# Patient Record
Sex: FEMALE | Race: WHITE | URBAN - METROPOLITAN AREA
[De-identification: names, ages, dates, MRNs, and addresses within clinical notes are randomized per-mention and may not be internally consistent; named-entity substitution may affect disease eponyms.]

---
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
## 2025-06-09 ENCOUNTER — OFFICE VISIT (OUTPATIENT)
Facility: CLINIC | Age: 20
End: 2025-06-09
Payer: COMMERCIAL

## 2025-06-09 VITALS
BODY MASS INDEX: 28.95 KG/M2 | DIASTOLIC BLOOD PRESSURE: 74 MMHG | OXYGEN SATURATION: 98 % | SYSTOLIC BLOOD PRESSURE: 120 MMHG | HEART RATE: 71 BPM | RESPIRATION RATE: 18 BRPM | HEIGHT: 63 IN | TEMPERATURE: 98.8 F | WEIGHT: 163.4 LBS

## 2025-06-09 DIAGNOSIS — R10.10 INTERMITTENT UPPER ABDOMINAL PAIN: ICD-10-CM

## 2025-06-09 DIAGNOSIS — G47.419 NARCOLEPSY WITHOUT CATAPLEXY: Primary | ICD-10-CM

## 2025-06-09 PROCEDURE — 99204 OFFICE O/P NEW MOD 45 MIN: CPT | Performed by: NURSE PRACTITIONER

## 2025-06-09 RX ORDER — METHYLPHENIDATE HYDROCHLORIDE 10 MG/1
10 CAPSULE, EXTENDED RELEASE ORAL
Qty: 15 CAPSULE | Refills: 0 | Status: SHIPPED | OUTPATIENT
Start: 2025-06-09 | End: 2025-06-11 | Stop reason: SDUPTHER

## 2025-06-09 RX ORDER — METHYLPHENIDATE HYDROCHLORIDE 5 MG/1
5 TABLET ORAL
Qty: 15 TABLET | Refills: 0 | Status: SHIPPED | OUTPATIENT
Start: 2025-06-09 | End: 2025-06-11 | Stop reason: SDUPTHER

## 2025-06-09 SDOH — ECONOMIC STABILITY: FOOD INSECURITY: WITHIN THE PAST 12 MONTHS, THE FOOD YOU BOUGHT JUST DIDN'T LAST AND YOU DIDN'T HAVE MONEY TO GET MORE.: NEVER TRUE

## 2025-06-09 SDOH — HEALTH STABILITY: PHYSICAL HEALTH: ON AVERAGE, HOW MANY MINUTES DO YOU ENGAGE IN EXERCISE AT THIS LEVEL?: 60 MIN

## 2025-06-09 SDOH — ECONOMIC STABILITY: FOOD INSECURITY: WITHIN THE PAST 12 MONTHS, YOU WORRIED THAT YOUR FOOD WOULD RUN OUT BEFORE YOU GOT MONEY TO BUY MORE.: NEVER TRUE

## 2025-06-09 SDOH — HEALTH STABILITY: PHYSICAL HEALTH: ON AVERAGE, HOW MANY DAYS PER WEEK DO YOU ENGAGE IN MODERATE TO STRENUOUS EXERCISE (LIKE A BRISK WALK)?: 4 DAYS

## 2025-06-09 ASSESSMENT — PATIENT HEALTH QUESTIONNAIRE - PHQ9
SUM OF ALL RESPONSES TO PHQ QUESTIONS 1-9: 0
1. LITTLE INTEREST OR PLEASURE IN DOING THINGS: NOT AT ALL
SUM OF ALL RESPONSES TO PHQ QUESTIONS 1-9: 0
SUM OF ALL RESPONSES TO PHQ QUESTIONS 1-9: 0
2. FEELING DOWN, DEPRESSED OR HOPELESS: NOT AT ALL
SUM OF ALL RESPONSES TO PHQ QUESTIONS 1-9: 0

## 2025-06-09 ASSESSMENT — ENCOUNTER SYMPTOMS
CHEST TIGHTNESS: 0
ABDOMINAL PAIN: 1
SHORTNESS OF BREATH: 0

## 2025-06-09 NOTE — PROGRESS NOTES
Siobhan Deng (:  2005) is a 20 y.o. female, New patient, presents alone, here for evaluation of the following chief complaint(s):  Narcolepsy  History obtained from patient.       Assessment & Plan  1. Narcolepsy.  - Previously prescribed modafinil, which caused eye fluttering and did not keep her awake.  - Another medication was prescribed but not picked up due to concerns about interactions with her birth control.  - Methylphenidate will be prescribed for 2 weeks to manage her narcolepsy symptoms.  - Advised to report back on the effectiveness of this treatment.    2. Abdominal pain.  - Reports intermittent sharp, crampy pain in the abdomen, which occurs randomly and can last about an hour.  - Pain is sometimes alleviated by stretching but not by rubbing; no heartburn or constipation experienced.  - Previous evaluations included blood work and an ultrasound, which suggested possible peptic ulcer or acid reflux.  - Further evaluation by a gastroenterologist is recommended to determine the cause of the pain.    Results      Siobhan was seen today for narcolepsy.    Diagnoses and all orders for this visit:    Narcolepsy without cataplexy  -     methylphenidate (RITALIN LA) 10 MG extended release capsule; Take 1 capsule by mouth every morning (before breakfast) for 15 days. Max Daily Amount: 10 mg  -     methylphenidate (RITALIN) 5 MG tablet; Take 1 tablet by mouth daily (before lunch) for 15 days. Max Daily Amount: 5 mg    Intermittent upper abdominal pain    [facility] and [facility], reviewed medications and side effects in detail  I have discussed the diagnosis with the patient and the intended plan as seen in the above orders.  The patient has received an after-visit summary and questions were answered concerning future plans.  I have discussed medication side effects and warnings with the patient as well. Patient agreeable with above plan and verbalizes understanding.    Return in

## 2025-06-09 NOTE — PROGRESS NOTES
Have you been to the ER, urgent care clinic since your last visit?  Hospitalized since your last visit?   NO    Have you seen or consulted any other health care providers outside our system since your last visit?   YES - When: approximately 3 months ago.  Where and Why: [facility].     “Have you had a pap smear?”    NO    No cervical cancer screening on file

## 2025-06-11 DIAGNOSIS — G47.419 NARCOLEPSY WITHOUT CATAPLEXY: ICD-10-CM

## 2025-06-11 RX ORDER — METHYLPHENIDATE HYDROCHLORIDE 10 MG/1
10 CAPSULE, EXTENDED RELEASE ORAL
Qty: 15 CAPSULE | Refills: 0 | Status: SHIPPED | OUTPATIENT
Start: 2025-06-11 | End: 2025-06-26

## 2025-06-11 RX ORDER — METHYLPHENIDATE HYDROCHLORIDE 5 MG/1
5 TABLET ORAL
Qty: 15 TABLET | Refills: 0 | Status: SHIPPED | OUTPATIENT
Start: 2025-06-11 | End: 2025-06-26

## 2025-06-25 ENCOUNTER — TELEMEDICINE (OUTPATIENT)
Facility: CLINIC | Age: 20
End: 2025-06-25
Payer: COMMERCIAL

## 2025-06-25 DIAGNOSIS — Z01.118 ABNORMAL OTOSCOPIC EXAM OF RIGHT EAR: ICD-10-CM

## 2025-06-25 DIAGNOSIS — G47.419 NARCOLEPSY WITHOUT CATAPLEXY: Primary | ICD-10-CM

## 2025-06-25 PROCEDURE — 99213 OFFICE O/P EST LOW 20 MIN: CPT | Performed by: NURSE PRACTITIONER

## 2025-06-25 RX ORDER — METHYLPHENIDATE HYDROCHLORIDE 10 MG/1
10 TABLET ORAL 2 TIMES DAILY
Qty: 60 TABLET | Refills: 0 | Status: SHIPPED | OUTPATIENT
Start: 2025-06-25 | End: 2025-07-25

## 2025-06-25 NOTE — PROGRESS NOTES
Siobhan Deng is a 20 y.o. (2005) female is a Established patient, presents alone, who was seen by synchronous (real-time) audio-video technology on 6/25/2025 for evaluation of the following:   Chief Complaint   Patient presents with    Narcolepsy   History obtained from patient.    Assessment & Plan:     Assessment & Plan  1. Narcolepsy.  - Immediate release 5 mg methylphenidate works well and lasts until lunch; second dose lasts until end of the day.  - Continues to sleep well at night.  - Prescription for methylphenidate 10 mg, to be taken twice daily, has been provided.  - Advised to break the tablets if needed and to report back on its effectiveness.    2. Hearing loss.  - Reports difficulty hearing, noticed by others.  - Examination revealed a hole in the right inner ear.  - Referral to Dr. Jung has been made for further evaluation and management.  - Advised to follow up with Dr. Jung for further assessment.    Narcolepsy without cataplexy  -     methylphenidate (RITALIN) 10 MG tablet; Take 1 tablet by mouth 2 times daily for 30 days. Max Daily Amount: 20 mg, Disp-60 tablet, R-0Normal  Abnormal otoscopic exam of right ear  -     Amb External Referral To ENT      No follow-ups on file.    Subjective:     History of Present Illness  The patient is a 20-year-old female who presents for a 2-week follow-up for hypersomnia since beginning methylphenidate.    She reports that the extended-release formulation of methylphenidate takes approximately 2 hours to take effect. The immediate-release 5 mg dose, on the other hand, has been effective in managing her symptoms. She typically takes a 10 mg dose in the morning, which sustains her until lunchtime around 1:00 PM. Subsequently, she administers a 5 mg dose that carries her through the rest of the day. She also notes that her sleep quality at night remains unaffected. It is noteworthy that the initial medication she was prescribed had a disruptive effect on her

## 2025-06-25 NOTE — PROGRESS NOTES
Siobhan Deng, was evaluated through a synchronous (real-time) audio-video encounter. The patient (or guardian if applicable) is aware that this is a billable service, which includes applicable co-pays. This Virtual Visit was conducted with patient's (and/or legal guardian's) consent. Patient identification was verified, and a caregiver was present when appropriate.   The patient was located at Home: [address] [place] [id number]  Provider was located at Facility (Appt Dept): [address] [place] [id number]  Confirm you are appropriately licensed, registered, or certified to deliver care in the state where the patient is located as indicated above. If you are not or unsure, please re-schedule the visit: Yes, I confirm.     Siobhan Deng (:  2005) is a Established patient, presenting virtually for evaluation of the following:      Below is the assessment and plan developed based on review of pertinent history, physical exam, labs, studies, and medications.         --Raymundo Petit

## 2025-07-08 DIAGNOSIS — Z13.83 SCREENING FOR CARDIOVASCULAR, RESPIRATORY, AND GENITOURINARY DISEASES: Primary | ICD-10-CM

## 2025-07-08 DIAGNOSIS — Z13.6 SCREENING FOR CARDIOVASCULAR, RESPIRATORY, AND GENITOURINARY DISEASES: Primary | ICD-10-CM

## 2025-07-08 DIAGNOSIS — Z83.49 FAMILY HISTORY OF THYROID DISEASE: ICD-10-CM

## 2025-07-08 DIAGNOSIS — Z86.2 HISTORY OF ANEMIA DUE TO VITAMIN B12 DEFICIENCY: ICD-10-CM

## 2025-07-08 DIAGNOSIS — Z13.89 SCREENING FOR CARDIOVASCULAR, RESPIRATORY, AND GENITOURINARY DISEASES: Primary | ICD-10-CM

## 2025-07-09 ENCOUNTER — LAB (OUTPATIENT)
Facility: CLINIC | Age: 20
End: 2025-07-09

## 2025-07-09 DIAGNOSIS — Z86.2 HISTORY OF ANEMIA DUE TO VITAMIN B12 DEFICIENCY: ICD-10-CM

## 2025-07-09 DIAGNOSIS — Z13.89 SCREENING FOR CARDIOVASCULAR, RESPIRATORY, AND GENITOURINARY DISEASES: ICD-10-CM

## 2025-07-09 DIAGNOSIS — Z13.83 SCREENING FOR CARDIOVASCULAR, RESPIRATORY, AND GENITOURINARY DISEASES: ICD-10-CM

## 2025-07-09 DIAGNOSIS — Z83.49 FAMILY HISTORY OF THYROID DISEASE: ICD-10-CM

## 2025-07-09 DIAGNOSIS — Z13.6 SCREENING FOR CARDIOVASCULAR, RESPIRATORY, AND GENITOURINARY DISEASES: ICD-10-CM

## 2025-07-11 LAB
BASOPHILS # BLD AUTO: 0 X10E3/UL (ref 0–0.2)
BASOPHILS NFR BLD AUTO: 1 %
EOSINOPHIL # BLD AUTO: 0.1 X10E3/UL (ref 0–0.4)
EOSINOPHIL NFR BLD AUTO: 1 %
ERYTHROCYTE [DISTWIDTH] IN BLOOD BY AUTOMATED COUNT: 12.5 % (ref 11.7–15.4)
HBA1C MFR BLD: 5.2 % (ref 4.8–5.6)
HCT VFR BLD AUTO: 45.8 % (ref 34–46.6)
HGB BLD-MCNC: 14.5 G/DL (ref 11.1–15.9)
IMM GRANULOCYTES # BLD AUTO: 0 X10E3/UL (ref 0–0.1)
IMM GRANULOCYTES NFR BLD AUTO: 0 %
LYMPHOCYTES # BLD AUTO: 1.9 X10E3/UL (ref 0.7–3.1)
LYMPHOCYTES NFR BLD AUTO: 41 %
MCH RBC QN AUTO: 31 PG (ref 26.6–33)
MCHC RBC AUTO-ENTMCNC: 31.7 G/DL (ref 31.5–35.7)
MCV RBC AUTO: 98 FL (ref 79–97)
MONOCYTES # BLD AUTO: 0.3 X10E3/UL (ref 0.1–0.9)
MONOCYTES NFR BLD AUTO: 7 %
NEUTROPHILS # BLD AUTO: 2.3 X10E3/UL (ref 1.4–7)
NEUTROPHILS NFR BLD AUTO: 50 %
PLATELET # BLD AUTO: 229 X10E3/UL (ref 150–450)
RBC # BLD AUTO: 4.68 X10E6/UL (ref 3.77–5.28)
SPECIMEN STATUS REPORT: NORMAL
WBC # BLD AUTO: 4.6 X10E3/UL (ref 3.4–10.8)

## 2025-07-12 LAB
ALBUMIN SERPL-MCNC: 4.1 G/DL (ref 4–5)
ALP SERPL-CCNC: 23 IU/L (ref 44–121)
ALT SERPL-CCNC: 20 IU/L (ref 0–32)
AST SERPL-CCNC: 25 IU/L (ref 0–40)
BILIRUB SERPL-MCNC: 0.2 MG/DL (ref 0–1.2)
BUN SERPL-MCNC: 16 MG/DL (ref 6–20)
BUN/CREAT SERPL: 16 (ref 9–23)
CALCIUM SERPL-MCNC: 9.3 MG/DL (ref 8.7–10.2)
CHLORIDE SERPL-SCNC: 101 MMOL/L (ref 96–106)
CHOLEST SERPL-MCNC: 174 MG/DL (ref 100–199)
CO2 SERPL-SCNC: 21 MMOL/L (ref 20–29)
CREAT SERPL-MCNC: 0.97 MG/DL (ref 0.57–1)
EGFRCR SERPLBLD CKD-EPI 2021: 85 ML/MIN/1.73
GLOBULIN SER CALC-MCNC: 2.5 G/DL (ref 1.5–4.5)
GLUCOSE SERPL-MCNC: 83 MG/DL (ref 70–99)
HDLC SERPL-MCNC: 43 MG/DL
LDLC SERPL CALC-MCNC: 109 MG/DL (ref 0–99)
POTASSIUM SERPL-SCNC: 4.5 MMOL/L (ref 3.5–5.2)
PROT SERPL-MCNC: 6.6 G/DL (ref 6–8.5)
SODIUM SERPL-SCNC: 140 MMOL/L (ref 134–144)
T3 SERPL-MCNC: 188 NG/DL (ref 71–180)
T4 FREE SERPL-MCNC: 1.15 NG/DL (ref 0.82–1.77)
TRIGL SERPL-MCNC: 121 MG/DL (ref 0–149)
TSH SERPL DL<=0.005 MIU/L-ACNC: 2.58 UIU/ML (ref 0.45–4.5)
VIT B12 SERPL-MCNC: 294 PG/ML (ref 232–1245)
VLDLC SERPL CALC-MCNC: 22 MG/DL (ref 5–40)

## 2025-07-16 ENCOUNTER — OFFICE VISIT (OUTPATIENT)
Facility: CLINIC | Age: 20
End: 2025-07-16
Payer: COMMERCIAL

## 2025-07-16 VITALS
BODY MASS INDEX: 28.21 KG/M2 | HEART RATE: 73 BPM | TEMPERATURE: 98.7 F | RESPIRATION RATE: 18 BRPM | DIASTOLIC BLOOD PRESSURE: 70 MMHG | OXYGEN SATURATION: 98 % | HEIGHT: 63 IN | WEIGHT: 159.2 LBS | SYSTOLIC BLOOD PRESSURE: 113 MMHG

## 2025-07-16 DIAGNOSIS — Z00.00 ENCOUNTER FOR WELL ADULT EXAM WITHOUT ABNORMAL FINDINGS: Primary | ICD-10-CM

## 2025-07-16 DIAGNOSIS — G47.419 NARCOLEPSY WITHOUT CATAPLEXY: ICD-10-CM

## 2025-07-16 PROCEDURE — 99395 PREV VISIT EST AGE 18-39: CPT | Performed by: NURSE PRACTITIONER

## 2025-07-16 NOTE — PROGRESS NOTES
Well Adult Note  Name: Siobhan Deng Today’s Date: 2025   MRN: [id number] Sex: Female   Age: 20 y.o. Ethnicity: Non- of /a   : 2005 Race: White (non-)      Siobhan Deng is here for a well adult exam.         Encounter for well adult exam without abnormal findings  Narcolepsy without cataplexy  Slight elevation of Total T3 may be related to  simulate.  Denies any symptoms of hyperthyroidism, will monitor levels.  Patient agreeable.  Previous abnormality seen right ear during  otoscopic exam not appreciated today.  Informed patient to still schedule with ENT due to trouble with hearing as of late.  Will begin taking methylphenidate 3 times a day 10 mg in the morning 5 mg around noon and another 5 mg around 4 PM  Results  Orders Only on 2025   Component Date Value Ref Range Status    WBC 2025 4.6  3.4 - 10.8 x10E3/uL Final    RBC 2025 4.68  3.77 - 5.28 x10E6/uL Final    Hemoglobin 2025 14.5  11.1 - 15.9 g/dL Final    Hematocrit 2025 45.8  34.0 - 46.6 % Final    MCV 2025 98 (H)  79 - 97 fL Final    MCH 2025 31.0  26.6 - 33.0 pg Final    MCHC 2025 31.7  31.5 - 35.7 g/dL Final    RDW 2025 12.5  11.7 - 15.4 % Final    Platelets 2025 229  150 - 450 x10E3/uL Final    Neutrophils % 2025 50  Not Estab. % Final    Lymphocytes % 2025 41  Not Estab. % Final    Monocytes % 2025 7  Not Estab. % Final    Eosinophils % 2025 1  Not Estab. % Final    Basophils % 2025 1  Not Estab. % Final    Neutrophils Absolute 2025 2.3  1.4 - 7.0 x10E3/uL Final    Lymphocytes Absolute 2025 1.9  0.7 - 3.1 x10E3/uL Final    Monocytes Absolute 2025 0.3  0.1 - 0.9 x10E3/uL Final    Eosinophils Absolute 2025 0.1  0.0 - 0.4 x10E3/uL Final    Basophils Absolute 2025 0.0  0.0 - 0.2 x10E3/uL Final    Immature Granulocytes % 2025 0  Not Estab. % Final    Immature Grans (Abs) 2025 0.0  0.0 - 0.1

## 2025-08-19 DIAGNOSIS — G47.419 NARCOLEPSY WITHOUT CATAPLEXY: ICD-10-CM

## 2025-08-19 RX ORDER — METHYLPHENIDATE HYDROCHLORIDE 10 MG/1
10 TABLET ORAL 2 TIMES DAILY
Qty: 180 TABLET | Refills: 0 | Status: SHIPPED | OUTPATIENT
Start: 2025-08-19 | End: 2025-11-17

## 2025-08-22 DIAGNOSIS — G47.419 NARCOLEPSY WITHOUT CATAPLEXY: ICD-10-CM

## 2025-08-22 RX ORDER — METHYLPHENIDATE HYDROCHLORIDE 10 MG/1
10 TABLET ORAL 2 TIMES DAILY
Qty: 180 TABLET | Refills: 0 | Status: CANCELLED | OUTPATIENT
Start: 2025-08-22 | End: 2025-11-20

## 2025-08-28 RX ORDER — METHYLPHENIDATE HYDROCHLORIDE 10 MG/1
10 TABLET ORAL 2 TIMES DAILY
Qty: 60 TABLET | Refills: 0 | Status: SHIPPED | OUTPATIENT
Start: 2025-08-28 | End: 2025-09-27